# Patient Record
(demographics unavailable — no encounter records)

---

## 2025-03-03 NOTE — HISTORY OF PRESENT ILLNESS
[Postpartum Follow Up] : postpartum follow up [Delivery Date: ___] : on [unfilled] [Female] : Delivery History: baby girl [Wt. ___] : weighing [unfilled] [Breastfeeding] : currently nursing [Back to Normal] : is back to normal in size [None] : None [Examination Of The Breasts] : breasts are normal [Resumed Menses] : has not resumed her menses [Resumed [place]] : has not resumed intercourse [Abdominal Pain] : no abdominal pain [Back Pain] : no back pain [Breast Pain] : no breast pain [BreastFeeding Problems] : no breastfeeding problems [Chest Pain] : no chest pain [Cracked Nipples] : no cracked nipples [Heavy Bleeding] : no heavy bleeding [Irregular Bleeding] : no irregular bleeding [Leg Pain] : no leg pain [Shortness of Breath] : no shortness of breath [Vaginal Discharge] : no vaginal discharge [Doing Well] : is doing well [No Sign of Infection] : is showing no signs of infection [Excellent Pain Control] : has excellent pain control [FreeTextEntry9] : JIA at 31 weeks [de-identified] : IOL for late term. 2nd degree and periurethral lacerations [de-identified] : Treated for mastitis 1 week postpartum [de-identified] : +Depression/anxiety, EPDS score 14. Notes passive SI, denies active SI or HI. Also having constipation [de-identified] : Lacerations well healed [de-identified] : -Counseled pt on PPD warning signs/sx and reasons to go to the ED. She notes she has been sharing everything withher  and is confident that if she starts to become a danger to herself, they would both notice and she would  go to a hospital right away -Prior to pregnancy, pt was on Prozac 20mg daily. Rx sent to restart this -[facility] referral placed. Pt will also try to see her own psychiatrist -Reviewed managing constipation w/ colace, metamucil, miralax prn, senna prn (but try to minimize as this can be habit forming), increased hydration -Counseled on BC, opts for condoms -F/u 3 months for annual or sooner prn